# Patient Record
Sex: FEMALE | Race: WHITE | Employment: FULL TIME | URBAN - METROPOLITAN AREA
[De-identification: names, ages, dates, MRNs, and addresses within clinical notes are randomized per-mention and may not be internally consistent; named-entity substitution may affect disease eponyms.]

---

## 2022-12-08 RX ORDER — GLIPIZIDE 2.5 MG/1
TABLET, EXTENDED RELEASE ORAL 2 TIMES DAILY
COMMUNITY

## 2022-12-08 RX ORDER — METFORMIN HYDROCHLORIDE 500 MG/1
2 TABLET, EXTENDED RELEASE ORAL 2 TIMES DAILY
COMMUNITY

## 2022-12-08 RX ORDER — HYDROXYZINE 25 MG/1
1 TABLET, FILM COATED ORAL
COMMUNITY
End: 2022-12-12 | Stop reason: ALTCHOICE

## 2022-12-08 RX ORDER — GLIPIZIDE 5 MG/1
TABLET, FILM COATED, EXTENDED RELEASE ORAL 2 TIMES DAILY
COMMUNITY

## 2022-12-08 RX ORDER — TRIAMCINOLONE ACETONIDE 1 MG/G
CREAM TOPICAL 2 TIMES DAILY
COMMUNITY

## 2022-12-08 RX ORDER — METOPROLOL SUCCINATE 25 MG/1
1 TABLET, EXTENDED RELEASE ORAL DAILY
COMMUNITY

## 2022-12-08 RX ORDER — CLOBETASOL PROPIONATE 0.5 MG/G
CREAM TOPICAL 2 TIMES WEEKLY
COMMUNITY

## 2022-12-08 RX ORDER — OLMESARTAN MEDOXOMIL AND HYDROCHLOROTHIAZIDE 40/12.5 40; 12.5 MG/1; MG/1
1 TABLET ORAL DAILY
COMMUNITY
End: 2022-12-12 | Stop reason: DRUGHIGH

## 2022-12-12 ENCOUNTER — OFFICE VISIT (OUTPATIENT)
Dept: CARDIOLOGY CLINIC | Age: 72
End: 2022-12-12
Payer: MEDICARE

## 2022-12-12 VITALS
HEART RATE: 92 BPM | HEIGHT: 66 IN | DIASTOLIC BLOOD PRESSURE: 78 MMHG | SYSTOLIC BLOOD PRESSURE: 148 MMHG | WEIGHT: 195.6 LBS | OXYGEN SATURATION: 96 % | BODY MASS INDEX: 31.43 KG/M2

## 2022-12-12 DIAGNOSIS — E11.9 TYPE 2 DIABETES MELLITUS WITHOUT COMPLICATION, WITHOUT LONG-TERM CURRENT USE OF INSULIN (HCC): ICD-10-CM

## 2022-12-12 DIAGNOSIS — I10 HYPERTENSION, UNSPECIFIED TYPE: Primary | ICD-10-CM

## 2022-12-12 DIAGNOSIS — R07.9 CHEST PAIN, UNSPECIFIED TYPE: ICD-10-CM

## 2022-12-12 PROCEDURE — 99204 OFFICE O/P NEW MOD 45 MIN: CPT | Performed by: INTERNAL MEDICINE

## 2022-12-12 PROCEDURE — 93010 ELECTROCARDIOGRAM REPORT: CPT | Performed by: INTERNAL MEDICINE

## 2022-12-12 PROCEDURE — 3077F SYST BP >= 140 MM HG: CPT | Performed by: INTERNAL MEDICINE

## 2022-12-12 PROCEDURE — 93005 ELECTROCARDIOGRAM TRACING: CPT | Performed by: INTERNAL MEDICINE

## 2022-12-12 PROCEDURE — G0463 HOSPITAL OUTPT CLINIC VISIT: HCPCS | Performed by: INTERNAL MEDICINE

## 2022-12-12 PROCEDURE — 1123F ACP DISCUSS/DSCN MKR DOCD: CPT | Performed by: INTERNAL MEDICINE

## 2022-12-12 PROCEDURE — 3078F DIAST BP <80 MM HG: CPT | Performed by: INTERNAL MEDICINE

## 2022-12-12 RX ORDER — OLMESARTAN MEDOXOMIL 40 MG/1
TABLET ORAL DAILY
COMMUNITY

## 2022-12-12 RX ORDER — AMLODIPINE BESYLATE 10 MG/1
10 TABLET ORAL DAILY
COMMUNITY

## 2022-12-12 RX ORDER — BUSPIRONE HYDROCHLORIDE 5 MG/1
5 TABLET ORAL 2 TIMES DAILY
COMMUNITY
Start: 2022-11-28

## 2022-12-12 NOTE — PROGRESS NOTES
Jey Felder is a 67 y.o. female    Chief Complaint   Patient presents with    New Patient     Elevated BP       Vitals:    12/12/22 0851 12/12/22 0912   BP: (!) 150/84 (!) 148/78   BP 1 Location: Left upper arm Left upper arm   BP Patient Position: Sitting Sitting   Pulse: 92    Height: 5' 6\" (1.676 m)    Weight: 195 lb 9.6 oz (88.7 kg)    SpO2: 96%        Chest pain no    SOB patient states SOB     Dizziness no    Swelling no    Refills no      1. Have you been to the ER, urgent care clinic since your last visit? Hospitalized since your last visit? [facility]     2. Have you seen or consulted any other health care providers outside of the [address] [place] [address] since your last visit? Include any pap smears or colon screening.  No

## 2022-12-12 NOTE — PROGRESS NOTES
Catrachita Barahona MD, MS, [facility]            HISTORY OF PRESENT ILLNESS:    Niels Oshea is a 67 y.o. female referred for elevated BP, CP. PMH DM2, HTN. Was on Prozac, stopped, went back on - developed CP, SOB and panic sensation. Went to [facility] ED - BP elevated, gonzales negative, sent home with cardiology FU. Was admitted 4 days, echo with bubble, cardiology consult. Told her to breathe, count to 10. CTPA negative for PE. Was hyponatremic. HCTZ stopped - olmesartan alone. Prozac was stopped, week of Thanksgiving. Started buspirone, Monday after Thanksgiving - has felt better. Now looks forward to things. Still SOB at times, less. Saw Dr. Lisbeth Catalan in August - BP elevated. FU Dr. Kendra Najjar - given hydrozyzine. COVID booster 9/21/2022 - BP went up. Anxiety with taking her BP. Amlodipine 5, then 10 added. Anderson Levinthall syndrome. Stress in life - lost children, parents, brother. Discussed BP control = exercise nuclear stress test.      Total time spent >35 min, reviewing my prior notes, referring physician notes, other subspecialty and primary care notes, all available lab values, all available cardiac testing, all available radiology imaging, vital signs, weights, medications, potential medication interactions, family history, preparing my notes, updating diagnoses, correcting chart errors from other providers, documenting the above, discussing findings/results/testing methodologies/plan with patient, ordering tests/lab, sending prescriptions. SUMMARY:   Problem List  Date Reviewed: 12/12/2022            Codes Class Noted    Diabetes ([facility] ICD-10-CM: E11.9  ICD-9-CM: 250.00  Unknown        Hypertension ICD-10-CM: I10  ICD-9-CM: 401.9  12/12/2022        Chest pain ICD-10-CM: R07.9  ICD-9-CM: 786.50  12/12/2022           Current Outpatient Medications on File Prior to Visit   Medication Sig    olmesartan (BENICAR) 40 mg tablet Take  by mouth daily.     amLODIPine (NORVASC) 10 mg tablet Take 10 mg by mouth daily. busPIRone (BUSPAR) 5 mg tablet Take 5 mg by mouth two (2) times a day. clobetasoL (TEMOVATE) 0.05 % topical cream Apply  to affected area two (2) times a week. glipiZIDE SR (GLUCOTROL XL) 5 mg CR tablet Take  by mouth two (2) times a day. metFORMIN ER (GLUCOPHAGE XR) 500 mg tablet Take 2 Tablets by mouth two (2) times a day. glipiZIDE SR (GLUCOTROL XL) 2.5 mg CR tablet Take  by mouth two (2) times a day. Take 2.5 mg orally in the morning everday in addition to 5mg orally BID    metoprolol succinate (TOPROL-XL) 25 mg XL tablet Take 1 Tablet by mouth daily. triamcinolone acetonide (KENALOG) 0.1 % topical cream Apply  to affected area two (2) times a day. use thin layer     No current facility-administered medications on file prior to visit. CARDIOLOGY STUDIES TO DATE:  No results found for this visit on 12/12/22.                 CARDIAC ROS:   negative    Past Medical History:   Diagnosis Date    Anxiety     Cancer ([facility]     ovarian    Diabetes ([facility]     Essential hypertension     Anderson-Leventhal syndrome        Family History   Problem Relation Age of Onset    Cancer Father     Cancer Brother        Social History     Socioeconomic History    Marital status:      Spouse name: Not on file    Number of children: Not on file    Years of education: Not on file    Highest education level: Not on file   Occupational History    Not on file   Tobacco Use    Smoking status: Never     Passive exposure: Never    Smokeless tobacco: Never   Substance and Sexual Activity    Alcohol use: Not on file    Drug use: Never    Sexual activity: Not on file   Other Topics Concern    Not on file   Social History Narrative    Not on file     Social Determinants of Health     Financial Resource Strain: Not on file   Food Insecurity: Not on file   Transportation Needs: Not on file   Physical Activity: Not on file   Stress: Not on file   Social Connections: Not on file Intimate Partner Violence: Not on file   Housing Stability: Not on file        GENERAL ROS:  A comprehensive review of systems was negative except for that written in the HPI. Visit Vitals  BP (!) 148/78 (BP 1 Location: Left upper arm, BP Patient Position: Sitting)   Pulse 92   Ht 5' 6\" (1.676 m)   Wt 88.7 kg (195 lb 9.6 oz)   SpO2 96%   BMI 31.57 kg/m²     Vitals:    12/12/22 0851 12/12/22 0912   BP: (!) 150/84 (!) 148/78   Pulse: 92    SpO2: 96%    Weight: 88.7 kg (195 lb 9.6 oz)    Height: 5' 6\" (1.676 m)         Wt Readings from Last 3 Encounters:   12/12/22 88.7 kg (195 lb 9.6 oz)            BP Readings from Last 3 Encounters:   12/12/22 (!) 148/78       PHYSICAL EXAM  General appearance: alert, cooperative, no distress, appears stated age  Neck: supple, symmetrical, trachea midline, no adenopathy, thyroid: not enlarged, symmetric, no tenderness/mass/nodules, no carotid bruit, and no JVD  Lungs: clear to auscultation bilaterally  Heart: regular rate and rhythm, S1, S2 normal, no murmur, click, rub or gallop  Extremities: extremities normal, atraumatic, no cyanosis or edema    No results found for: CHOL, CHOLX, CHLST, CHOLV, 049831, HDL, HDLP, LDL, LDLC, DLDLP, TGLX, TRIGL, TRIGP, CHHD, CHHDX    No results found for: WBC, WBCLT, HGBPOC, HGB, HGBP, HCTPOC, HCT, PHCT, RBCH, PLT, MCV, HGBEXT, HCTEXT, PLTEXT, HGBEXT, HCTEXT, PLTEXT     No results found for: CHOL, CHOLPOCT, CHOLX, CHLST, CHOLV, HDL, HDLP, LDL, LDLC, DLDLP, TGLX, TRIGL, TRIGP, TGLPOCT, NTGLT, CHHD, CHHDX     ASSESSMENT  Diagnoses and all orders for this visit:    1. Hypertension, unspecified type  -     AMB POC EKG ROUTINE W/ 12 LEADS, INTER & REP    2. Type 2 diabetes mellitus without complication, without long-term current use of insulin (HCC)    3. Chest pain, unspecified type       Encounter Diagnoses   Name Primary?     Hypertension, unspecified type Yes    Type 2 diabetes mellitus without complication, without long-term current use of insulin ([address]     Chest pain, unspecified type      Orders Placed This Encounter    AMB POC EKG ROUTINE W/ 12 LEADS, INTER & REP    clobetasoL (TEMOVATE) 0.05 % topical cream    glipiZIDE SR (GLUCOTROL XL) 5 mg CR tablet    metFORMIN ER (GLUCOPHAGE XR) 500 mg tablet    DISCONTD: olmesartan-hydroCHLOROthiazide (BENICAR HCT) 40-12.5 mg per tablet    glipiZIDE SR (GLUCOTROL XL) 2.5 mg CR tablet    DISCONTD: hydrOXYzine HCL (ATARAX) 25 mg tablet    metoprolol succinate (TOPROL-XL) 25 mg XL tablet    triamcinolone acetonide (KENALOG) 0.1 % topical cream    olmesartan (BENICAR) 40 mg tablet    amLODIPine (NORVASC) 10 mg tablet    busPIRone (BUSPAR) 5 mg tablet       Plan          Estee Soliman MD  12/12/2022        [address] [place] [address] [address]  [address]  [address] [place] [address] (F)    [address] [place] [address] Nw  [phone] (P)  [phone] (F)    ATTENTION:   This medical record was transcribed using an electronic medical records/speech recognition system. Although proofread, it may and can contain electronic, spelling and other errors. Corrections may be executed at a later time. Please feel free to contact us for any clarifications as needed.

## 2023-01-03 ENCOUNTER — ANCILLARY PROCEDURE (OUTPATIENT)
Dept: CARDIOLOGY CLINIC | Age: 73
End: 2023-01-03
Payer: MEDICARE

## 2023-01-03 VITALS — WEIGHT: 195 LBS | HEIGHT: 66 IN | BODY MASS INDEX: 31.34 KG/M2

## 2023-01-03 DIAGNOSIS — R07.9 CHEST PAIN, UNSPECIFIED TYPE: ICD-10-CM

## 2023-01-03 DIAGNOSIS — E11.9 TYPE 2 DIABETES MELLITUS WITHOUT COMPLICATION, WITHOUT LONG-TERM CURRENT USE OF INSULIN (HCC): ICD-10-CM

## 2023-01-03 DIAGNOSIS — I10 HYPERTENSION, UNSPECIFIED TYPE: ICD-10-CM

## 2023-01-03 PROCEDURE — 93017 CV STRESS TEST TRACING ONLY: CPT | Performed by: INTERNAL MEDICINE

## 2023-01-03 PROCEDURE — A9500 TC99M SESTAMIBI: HCPCS | Performed by: INTERNAL MEDICINE

## 2023-01-03 RX ORDER — TETRAKIS(2-METHOXYISOBUTYLISOCYANIDE)COPPER(I) TETRAFLUOROBORATE 1 MG/ML
30 INJECTION, POWDER, LYOPHILIZED, FOR SOLUTION INTRAVENOUS ONCE
Status: COMPLETED | OUTPATIENT
Start: 2023-01-03 | End: 2023-01-03

## 2023-01-03 RX ORDER — TETRAKIS(2-METHOXYISOBUTYLISOCYANIDE)COPPER(I) TETRAFLUOROBORATE 1 MG/ML
10 INJECTION, POWDER, LYOPHILIZED, FOR SOLUTION INTRAVENOUS ONCE
Status: COMPLETED | OUTPATIENT
Start: 2023-01-03 | End: 2023-01-03

## 2023-01-03 RX ADMIN — TECHNETIUM TC 99M SESTAMIBI 24.6 MILLICURIE: 1 INJECTION, POWDER, FOR SOLUTION INTRAVENOUS at 09:42

## 2023-01-03 RX ADMIN — TECHNETIUM TC 99M SESTAMIBI 8.1 MILLICURIE: 1 INJECTION, POWDER, FOR SOLUTION INTRAVENOUS at 08:14

## 2023-01-06 LAB
NUC STRESS EJECTION FRACTION: 83 %
STRESS ANGINA INDEX: 0
STRESS BASELINE DIAS BP: 68 MMHG
STRESS BASELINE HR: 103 BPM
STRESS BASELINE ST DEPRESSION: 0 MM
STRESS BASELINE SYS BP: 140 MMHG
STRESS ESTIMATED WORKLOAD: 4.6 METS
STRESS EXERCISE DUR MIN: 3 MIN
STRESS EXERCISE DUR SEC: 31 SEC
STRESS O2 SAT PEAK: 98 %
STRESS O2 SAT REST: 98 %
STRESS PEAK DIAS BP: 74 MMHG
STRESS PEAK SYS BP: 164 MMHG
STRESS PERCENT HR ACHIEVED: 93 %
STRESS POST PEAK HR: 137 BPM
STRESS RATE PRESSURE PRODUCT: NORMAL BPM*MMHG
STRESS SR DUKE TREADMILL SCORE: 4
STRESS ST DEPRESSION: 0 MM
STRESS TARGET HR: 148 BPM

## 2023-01-06 NOTE — PROGRESS NOTES
Hi, good news-your stress test showed normal blood flow to the heart muscle.   Your heart function was normal.    Dr. Ivan Fry

## 2023-01-11 ENCOUNTER — PATIENT MESSAGE (OUTPATIENT)
Dept: CARDIOLOGY CLINIC | Age: 73
End: 2023-01-11

## 2023-01-12 NOTE — TELEPHONE ENCOUNTER
Eduardo Painting RN 1/12/2023 8:27 AM EST      ----- Message -----  From: Mallika Sharpe  Sent: 1/11/2023 10:21 PM EST  To: [facility] Nurse Pool  Subject: Question regarding NUCLEAR CARDIAC STRESS TE*    Now that stress test is done and normal, should I have a follow up appointment with you to discuss everything and medications? Thank you.

## 2023-02-07 ENCOUNTER — OFFICE VISIT (OUTPATIENT)
Dept: CARDIOLOGY CLINIC | Age: 73
End: 2023-02-07
Payer: MEDICARE

## 2023-02-07 VITALS
HEART RATE: 86 BPM | SYSTOLIC BLOOD PRESSURE: 146 MMHG | HEIGHT: 66 IN | WEIGHT: 205 LBS | OXYGEN SATURATION: 97 % | DIASTOLIC BLOOD PRESSURE: 64 MMHG | BODY MASS INDEX: 32.95 KG/M2

## 2023-02-07 DIAGNOSIS — R06.02 SOB (SHORTNESS OF BREATH): ICD-10-CM

## 2023-02-07 DIAGNOSIS — E11.9 TYPE 2 DIABETES MELLITUS WITHOUT COMPLICATION, WITHOUT LONG-TERM CURRENT USE OF INSULIN (HCC): ICD-10-CM

## 2023-02-07 DIAGNOSIS — I10 HYPERTENSION, UNSPECIFIED TYPE: Primary | ICD-10-CM

## 2023-02-07 PROCEDURE — G8417 CALC BMI ABV UP PARAM F/U: HCPCS | Performed by: INTERNAL MEDICINE

## 2023-02-07 PROCEDURE — 99214 OFFICE O/P EST MOD 30 MIN: CPT | Performed by: INTERNAL MEDICINE

## 2023-02-07 PROCEDURE — 1101F PT FALLS ASSESS-DOCD LE1/YR: CPT | Performed by: INTERNAL MEDICINE

## 2023-02-07 PROCEDURE — G8400 PT W/DXA NO RESULTS DOC: HCPCS | Performed by: INTERNAL MEDICINE

## 2023-02-07 PROCEDURE — 1123F ACP DISCUSS/DSCN MKR DOCD: CPT | Performed by: INTERNAL MEDICINE

## 2023-02-07 PROCEDURE — 1090F PRES/ABSN URINE INCON ASSESS: CPT | Performed by: INTERNAL MEDICINE

## 2023-02-07 PROCEDURE — 3078F DIAST BP <80 MM HG: CPT | Performed by: INTERNAL MEDICINE

## 2023-02-07 PROCEDURE — G8510 SCR DEP NEG, NO PLAN REQD: HCPCS | Performed by: INTERNAL MEDICINE

## 2023-02-07 PROCEDURE — G8536 NO DOC ELDER MAL SCRN: HCPCS | Performed by: INTERNAL MEDICINE

## 2023-02-07 PROCEDURE — 3077F SYST BP >= 140 MM HG: CPT | Performed by: INTERNAL MEDICINE

## 2023-02-07 PROCEDURE — 2022F DILAT RTA XM EVC RTNOPTHY: CPT | Performed by: INTERNAL MEDICINE

## 2023-02-07 PROCEDURE — 3046F HEMOGLOBIN A1C LEVEL >9.0%: CPT | Performed by: INTERNAL MEDICINE

## 2023-02-07 PROCEDURE — G8427 DOCREV CUR MEDS BY ELIG CLIN: HCPCS | Performed by: INTERNAL MEDICINE

## 2023-02-07 PROCEDURE — G0463 HOSPITAL OUTPT CLINIC VISIT: HCPCS | Performed by: INTERNAL MEDICINE

## 2023-02-07 PROCEDURE — 3017F COLORECTAL CA SCREEN DOC REV: CPT | Performed by: INTERNAL MEDICINE

## 2023-02-07 RX ORDER — DOXAZOSIN 4 MG/1
4 TABLET ORAL DAILY
Qty: 90 TABLET | Refills: 3 | Status: SHIPPED | OUTPATIENT
Start: 2023-02-07

## 2023-02-07 NOTE — PROGRESS NOTES
Chief Complaint   Patient presents with    Follow-up     Testing follow up     Vitals:    02/07/23 0853 02/07/23 0900   BP: (!) 150/68 (!) 146/64   BP 1 Location: Left upper arm Right upper arm   BP Patient Position: Sitting Sitting   Pulse: 86    Height: 5' 6\" (1.676 m)    Weight: 205 lb (93 kg)    SpO2: 97%      Chest pain denied   SOB random  Palpitations denied   Swelling in hands/feet denied   Dizziness denied   Recent hospital stays denied   Refills denied

## 2023-02-07 NOTE — PROGRESS NOTES
Per Dr. Joanne Munoz- follow up 6-8 weeks. Pulmonary referral-PFTs for SOB. Referred contact info provided to patient.

## 2023-02-07 NOTE — PROGRESS NOTES
Lexie Calvo MD, MS, [facility]            HISTORY OF PRESENT ILLNESS:    Jai Payan is a 67 y.o. female referred for elevated BP, CP. PMH DM2, HTN. Was on Prozac, stopped, went back on - developed CP, SOB and panic sensation. Went to [facility] ED - BP elevated, gonzales negative, sent home with cardiology FU. Was admitted 4 days, echo with bubble, cardiology consult. Told her to breathe, count to 10. CTPA negative for PE. Was hyponatremic. HCTZ stopped - olmesartan alone. Prozac was stopped, week of Thanksgiving. Started buspirone, Monday after Thanksgiving - has felt better. Now looks forward to things. Still SOB at times, less. Saw Dr. Gasper Tony in August - BP elevated. FU Dr. Namrata Justice - given hydrozyzine. COVID booster 9/21/2022 - BP went up. Anxiety with taking her BP. Amlodipine 5, then 10 added. Anderson Levinthall syndrome. Stress in life - lost children, parents, brother. Discussed BP control = exercise nuclear stress test - performed 1/3/2023 - normal exercise nuclear stress test @ 93% MPHR, normal perfusion, post stress EF 83%. SUMMARY:   Problem List  Date Reviewed: 2/7/2023            Codes Class Noted    Diabetes ([facility] ICD-10-CM: E11.9  ICD-9-CM: 250.00  Unknown        Hypertension ICD-10-CM: I10  ICD-9-CM: 401.9  12/12/2022        Chest pain ICD-10-CM: R07.9  ICD-9-CM: 786.50  12/12/2022           Current Outpatient Medications on File Prior to Visit   Medication Sig    olmesartan (BENICAR) 40 mg tablet Take  by mouth daily. amLODIPine (NORVASC) 10 mg tablet Take 10 mg by mouth daily. busPIRone (BUSPAR) 5 mg tablet Take 5 mg by mouth two (2) times a day. clobetasoL (TEMOVATE) 0.05 % topical cream Apply  to affected area two (2) times a week. glipiZIDE SR (GLUCOTROL XL) 5 mg CR tablet Take  by mouth two (2) times a day. metFORMIN ER (GLUCOPHAGE XR) 500 mg tablet Take 2 Tablets by mouth two (2) times a day.     glipiZIDE SR (GLUCOTROL XL) 2.5 mg CR tablet Take 2.5 mg by mouth two (2) times a day. Along with 5mg tab bid to equal 7.5mg bid    metoprolol succinate (TOPROL-XL) 25 mg XL tablet Take 1 Tablet by mouth daily. triamcinolone acetonide (KENALOG) 0.1 % topical cream Apply  to affected area two (2) times a day. use thin layer     No current facility-administered medications on file prior to visit. CARDIOLOGY STUDIES TO DATE:  No results found for this visit on 02/07/23. CARDIAC ROS:   negative    Past Medical History:   Diagnosis Date    Anxiety     Cancer ([facility]     ovarian    Diabetes ([facility]     Essential hypertension     Anderson-Leventhal syndrome        Family History   Problem Relation Age of Onset    Cancer Father     Cancer Brother        Social History     Socioeconomic History    Marital status:      Spouse name: Not on file    Number of children: Not on file    Years of education: Not on file    Highest education level: Not on file   Occupational History    Not on file   Tobacco Use    Smoking status: Never     Passive exposure: Never    Smokeless tobacco: Never   Substance and Sexual Activity    Alcohol use: Not on file    Drug use: Never    Sexual activity: Not on file   Other Topics Concern    Not on file   Social History Narrative    Not on file     Social Determinants of Health     Financial Resource Strain: Not on file   Food Insecurity: Not on file   Transportation Needs: Not on file   Physical Activity: Not on file   Stress: Not on file   Social Connections: Not on file   Intimate Partner Violence: Not on file   Housing Stability: Not on file        GENERAL ROS:  A comprehensive review of systems was negative except for that written in the HPI.     Visit Vitals  BP (!) 146/64 (BP 1 Location: Right upper arm, BP Patient Position: Sitting)   Pulse 86   Ht 5' 6\" (1.676 m)   Wt 93 kg (205 lb)   SpO2 97%   BMI 33.09 kg/m²     Vitals:    02/07/23 0853 02/07/23 0900   BP: (!) 150/68 (!) 146/64   Pulse: 86    SpO2: 97%    Weight: 93 kg (205 lb)    Height: 5' 6\" (1.676 m)         Wt Readings from Last 3 Encounters:   02/07/23 93 kg (205 lb)   01/03/23 88.5 kg (195 lb)   12/12/22 88.7 kg (195 lb 9.6 oz)            BP Readings from Last 3 Encounters:   02/07/23 (!) 146/64   12/12/22 (!) 148/78       PHYSICAL EXAM  General appearance: alert, cooperative, no distress, appears stated age  Neck: supple, symmetrical, trachea midline, no adenopathy, thyroid: not enlarged, symmetric, no tenderness/mass/nodules, no carotid bruit, and no JVD  Lungs: clear to auscultation bilaterally  Heart: regular rate and rhythm, S1, S2 normal, no murmur, click, rub or gallop  Extremities: extremities normal, atraumatic, no cyanosis or edema    No results found for: CHOL, CHOLX, CHLST, CHOLV, 606861, HDL, HDLP, LDL, LDLC, DLDLP, TGLX, TRIGL, TRIGP, CHHD, CHHDX    No results found for: WBC, WBCLT, HGBPOC, HGB, HGBP, HCTPOC, HCT, PHCT, RBCH, PLT, MCV, HGBEXT, HCTEXT, PLTEXT, HGBEXT, HCTEXT, PLTEXT     No results found for: CHOL, CHOLPOCT, CHOLX, CHLST, CHOLV, HDL, HDLP, LDL, LDLC, DLDLP, TGLX, TRIGL, TRIGP, TGLPOCT, NTGLT, CHHD, CHHDX     ASSESSMENT  Diagnoses and all orders for this visit:    1. Hypertension, unspecified type  -     doxazosin (CARDURA) 4 mg tablet; Take 1 Tablet by mouth daily. 2. Type 2 diabetes mellitus without complication, without long-term current use of insulin ([facility]         Encounter Diagnoses   Name Primary? Hypertension, unspecified type Yes    Type 2 diabetes mellitus without complication, without long-term current use of insulin ([facility]        Orders Placed This Encounter    doxazosin (CARDURA) 4 mg tablet         Plan      Follow-up and Dispositions    Return in about 8 weeks (around 4/4/2023).          Delmer Montana MD  2/7/2023        [address]  [address]  [place], [address] [place] [address]  [phone] (U)  [phone] (F)    [address] [facility]  [address]  [phone]  [phone] (F)    ATTENTION:   This medical record was transcribed using an electronic medical records/speech recognition system. Although proofread, it may and can contain electronic, spelling and other errors. Corrections may be executed at a later time. Please feel free to contact us for any clarifications as needed.

## 2023-02-09 ENCOUNTER — TELEPHONE (OUTPATIENT)
Dept: CARDIOLOGY CLINIC | Age: 73
End: 2023-02-09

## 2023-02-09 NOTE — TELEPHONE ENCOUNTER
[facility]     Per Dr. Mounika Shannon stop doxazosin. See if patient has taken a BB. Per Dr. Mounika Shannon  \"Stop medication. BB would be next choice - can she tolerate metoprolol, has she tried it?   If not send toprol xl 25\"

## 2023-02-09 NOTE — TELEPHONE ENCOUNTER
Patient started a new medication Doxazosin and she is having an allergic reaction to the medicine within an hour of taking it. Patient said she felt weak,dry heaves, light headed. /64 104/52.    [phone]

## 2023-02-10 ENCOUNTER — TELEPHONE (OUTPATIENT)
Dept: CARDIOLOGY CLINIC | Age: 73
End: 2023-02-10

## 2023-02-10 NOTE — TELEPHONE ENCOUNTER
Patient calling to follow up on call from yesterday about having an allergic reaction to a medication she is taking.  Please advise    pt # [phone] will be there until 2pm

## 2023-02-13 NOTE — TELEPHONE ENCOUNTER
Voicemail was left on Thursday for patient and see previous documentation:  You 4 days ago     [address]      Per Dr. Shari Miles stop doxazosin. See if patient has taken a BB. Per Dr. Shari Miles  \"Stop medication. BB would be next choice - can she tolerate metoprolol, has she tried it? If not send toprol xl 25\"         Note       Patient denied voicemail. Verified both numbers on file. Discussed with Dr. Shari Miles that patient is currently on toprol xl 25mg daily and history of low HR- Per Dr. Shari Miles- trial Toprol XL 50mg daily and monitor Bp/HR at home and if tolerating will send new script.

## 2023-02-20 ENCOUNTER — PATIENT MESSAGE (OUTPATIENT)
Dept: CARDIOLOGY CLINIC | Age: 73
End: 2023-02-20

## 2023-02-20 NOTE — TELEPHONE ENCOUNTER
Spoke with patient after ID x2 and conveyed no changes at this time. Patient expressed understanding and will return call as needed. Per Dr. Perez ChaudhryBilly Devoid her and continue current regimen. \"                               ----- Message -----  From: Jailyn Bradley  Sent: 2/20/2023   8:16 AM EST  To: [facility] Nurse Pool  Subject: BP results                                       Hi Dr. Ellyn Clayton. I have been taking 40 mg. Olmesartan and 10 mg Amlodipine Besylate with breakfast and I doubled my Metoprolol Succ. from25 mg to 50 mg. per your instructions. I take the Metoprolol with dinner. My BP has stayed the same or gone up and I am getting very nervous. I started with the doubled Metoprolol (50 mg) on Feb. 14, 2023. Here are my BPs each day since then :  146/66, 149/65, 158/72, 153/68, 158/70 and this morning about an hour after meds it is 149/68. Please advise me going forward.   Thanks so much, Jailyn Bradley

## 2023-03-28 ENCOUNTER — OFFICE VISIT (OUTPATIENT)
Dept: CARDIOLOGY CLINIC | Age: 73
End: 2023-03-28
Payer: MEDICARE

## 2023-03-28 VITALS
HEIGHT: 66 IN | SYSTOLIC BLOOD PRESSURE: 138 MMHG | BODY MASS INDEX: 32.78 KG/M2 | DIASTOLIC BLOOD PRESSURE: 70 MMHG | OXYGEN SATURATION: 97 % | WEIGHT: 204 LBS | HEART RATE: 67 BPM

## 2023-03-28 DIAGNOSIS — R07.9 CHEST PAIN, UNSPECIFIED TYPE: ICD-10-CM

## 2023-03-28 DIAGNOSIS — I10 HYPERTENSION, UNSPECIFIED TYPE: Primary | ICD-10-CM

## 2023-03-28 DIAGNOSIS — R06.02 SOB (SHORTNESS OF BREATH): ICD-10-CM

## 2023-03-28 DIAGNOSIS — E11.9 TYPE 2 DIABETES MELLITUS WITHOUT COMPLICATION, WITHOUT LONG-TERM CURRENT USE OF INSULIN (HCC): ICD-10-CM

## 2023-03-28 PROCEDURE — 99214 OFFICE O/P EST MOD 30 MIN: CPT | Performed by: INTERNAL MEDICINE

## 2023-03-28 PROCEDURE — 1090F PRES/ABSN URINE INCON ASSESS: CPT | Performed by: INTERNAL MEDICINE

## 2023-03-28 PROCEDURE — G8536 NO DOC ELDER MAL SCRN: HCPCS | Performed by: INTERNAL MEDICINE

## 2023-03-28 PROCEDURE — 1123F ACP DISCUSS/DSCN MKR DOCD: CPT | Performed by: INTERNAL MEDICINE

## 2023-03-28 PROCEDURE — 3046F HEMOGLOBIN A1C LEVEL >9.0%: CPT | Performed by: INTERNAL MEDICINE

## 2023-03-28 PROCEDURE — G8432 DEP SCR NOT DOC, RNG: HCPCS | Performed by: INTERNAL MEDICINE

## 2023-03-28 PROCEDURE — G8417 CALC BMI ABV UP PARAM F/U: HCPCS | Performed by: INTERNAL MEDICINE

## 2023-03-28 PROCEDURE — 3075F SYST BP GE 130 - 139MM HG: CPT | Performed by: INTERNAL MEDICINE

## 2023-03-28 PROCEDURE — 1101F PT FALLS ASSESS-DOCD LE1/YR: CPT | Performed by: INTERNAL MEDICINE

## 2023-03-28 PROCEDURE — G0463 HOSPITAL OUTPT CLINIC VISIT: HCPCS | Performed by: INTERNAL MEDICINE

## 2023-03-28 PROCEDURE — G8400 PT W/DXA NO RESULTS DOC: HCPCS | Performed by: INTERNAL MEDICINE

## 2023-03-28 PROCEDURE — 3017F COLORECTAL CA SCREEN DOC REV: CPT | Performed by: INTERNAL MEDICINE

## 2023-03-28 PROCEDURE — 2022F DILAT RTA XM EVC RTNOPTHY: CPT | Performed by: INTERNAL MEDICINE

## 2023-03-28 PROCEDURE — 3078F DIAST BP <80 MM HG: CPT | Performed by: INTERNAL MEDICINE

## 2023-03-28 PROCEDURE — G8427 DOCREV CUR MEDS BY ELIG CLIN: HCPCS | Performed by: INTERNAL MEDICINE

## 2023-03-28 RX ORDER — METOPROLOL SUCCINATE 50 MG/1
50 TABLET, EXTENDED RELEASE ORAL DAILY
COMMUNITY

## 2023-03-28 NOTE — PROGRESS NOTES
Lettie Bellis Penne Goodpasture MD, [place], [facility]            HISTORY OF PRESENT ILLNESS:    Marv Duncan is a 67 y.o. female referred for elevated BP, CP here for FU. PMH DM2, HTN. Was on Prozac, stopped, went back on - developed CP, SOB and panic sensation. Went to [facility] ED - BP elevated, gonzales negative, sent home with cardiology FU. Was admitted 4 days, echo with bubble, cardiology consult. Told her to breathe, count to 10. CTPA negative for PE. Was hyponatremic. HCTZ stopped - olmesartan alone. Prozac was stopped, week of Thanksgiving. Started buspirone, Monday after Thanksgiving - has felt better. Now looks forward to things. Still SOB at times, less. Saw Dr. Yuan Elmore in August - BP elevated. FU Dr. Kaity Ac - given hydrozyzine. COVID booster 9/21/2022 - BP went up. Anxiety with taking her BP. Amlodipine 5, then 10 added. Anderson Levinthall syndrome. Stress in life - lost children, parents, brother. Discussed BP control = exercise nuclear stress test - performed 1/3/2023 - normal exercise nuclear stress test @ 93% MPHR, normal perfusion, post stress EF 83%. Rx'd doxasozin  - nausea, dry heave, LH, fatigued. Stopped. Added toprol increased to 50. BP fair, better. Plan to FU in 3 months- if Bps consistent lower may try and decreased amlodipine to 5 given mild LE edema. SUMMARY:   Problem List  Date Reviewed: 3/28/2023            Codes Class Noted    SOB (shortness of breath) ICD-10-CM: R06.02  ICD-9-CM: 786.05  2/7/2023        Diabetes ([facility] [address] ICD-10-CM: E11.9  ICD-9-CM: 250.00  Unknown        Hypertension ICD-10-CM: I10  ICD-9-CM: 401.9  12/12/2022        Chest pain ICD-10-CM: R07.9  ICD-9-CM: 786.50  12/12/2022           Current Outpatient Medications on File Prior to Visit   Medication Sig    metoprolol succinate (TOPROL-XL) 50 mg XL tablet Take 50 mg by mouth daily. olmesartan (BENICAR) 40 mg tablet Take  by mouth daily.     amLODIPine (NORVASC) 10 mg tablet Take 10 mg by mouth daily. busPIRone (BUSPAR) 5 mg tablet Take 5 mg by mouth two (2) times a day. clobetasoL (TEMOVATE) 0.05 % topical cream Apply  to affected area two (2) times a week. metFORMIN ER (GLUCOPHAGE XR) 500 mg tablet Take 2 Tablets by mouth two (2) times a day. glipiZIDE SR (GLUCOTROL XL) 2.5 mg CR tablet Take 5 mg by mouth two (2) times a day. Along with 5mg tab bid to equal 7.5mg bid    triamcinolone acetonide (KENALOG) 0.1 % topical cream Apply  to affected area two (2) times a day. use thin layer     No current facility-administered medications on file prior to visit. CARDIOLOGY STUDIES TO DATE:  No results found for this visit on 03/28/23. CARDIAC ROS:   negative    Past Medical History:   Diagnosis Date    Anxiety     Cancer ([facility] [address]     ovarian    Diabetes ([facility] [address]     Essential hypertension     Anderson-Leventhal syndrome        Family History   Problem Relation Age of Onset    Cancer Father     Cancer Brother        Social History     Socioeconomic History    Marital status:      Spouse name: Not on file    Number of children: Not on file    Years of education: Not on file    Highest education level: Not on file   Occupational History    Not on file   Tobacco Use    Smoking status: Never     Passive exposure: Never    Smokeless tobacco: Never   Substance and Sexual Activity    Alcohol use: Not on file    Drug use: Never    Sexual activity: Not on file   Other Topics Concern    Not on file   Social History Narrative    Not on file     Social Determinants of Health     Financial Resource Strain: Not on file   Food Insecurity: Not on file   Transportation Needs: Not on file   Physical Activity: Not on file   Stress: Not on file   Social Connections: Not on file   Intimate Partner Violence: Not on file   Housing Stability: Not on file        GENERAL ROS:  A comprehensive review of systems was negative except for that written in the HPI.     Visit Vitals  /70 (BP 1 Location: Left upper arm, BP Patient Position: Sitting, BP Cuff Size: Adult)   Pulse 67   Ht 5' 6\" (1.676 m)   Wt 92.5 kg (204 lb)   SpO2 97%   BMI 32.93 kg/m²     Vitals:    03/28/23 0858   BP: 138/70   Pulse: 67   SpO2: 97%   Weight: 92.5 kg (204 lb)   Height: 5' 6\" (1.676 m)        Wt Readings from Last 3 Encounters:   03/28/23 92.5 kg (204 lb)   02/07/23 93 kg (205 lb)   01/03/23 88.5 kg (195 lb)            BP Readings from Last 3 Encounters:   03/28/23 138/70   02/07/23 (!) 146/64   12/12/22 (!) 148/78       PHYSICAL EXAM  General appearance: alert, cooperative, no distress, appears stated age  Neck: supple, symmetrical, trachea midline, no adenopathy, thyroid: not enlarged, symmetric, no tenderness/mass/nodules, no carotid bruit, and no JVD  Lungs: clear to auscultation bilaterally  Heart: regular rate and rhythm, S1, S2 normal, no murmur, click, rub or gallop  Extremities: extremities normal, atraumatic, no cyanosis or edema    No results found for: CHOL, CHOLX, CHLST, CHOLV, [id number], HDL, HDLP, LDL, LDLC, DLDLP, TGLX, TRIGL, TRIGP, CHHD, CHHDX    No results found for: WBC, WBCLT, HGBPOC, HGB, HGBP, HCTPOC, HCT, PHCT, RBCH, PLT, MCV, HGBEXT, HCTEXT, PLTEXT, HGBEXT, HCTEXT, PLTEXT     No results found for: CHOL, CHOLPOCT, CHOLX, CHLST, CHOLV, HDL, HDLP, LDL, LDLC, DLDLP, TGLX, TRIGL, TRIGP, TGLPOCT, NTGLT, CHHD, CHHDX     ASSESSMENT  Diagnoses and all orders for this visit:    1. Hypertension, unspecified type    2. Chest pain, unspecified type    3. SOB (shortness of breath)    4. Type 2 diabetes mellitus without complication, without long-term current use of insulin ([facility]           Encounter Diagnoses   Name Primary?     Hypertension, unspecified type Yes    Chest pain, unspecified type     SOB (shortness of breath)     Type 2 diabetes mellitus without complication, without long-term current use of insulin ([facility])          Orders Placed This Encounter    metoprolol succinate (TOPROL-XL) 50 mg XL tablet Otilia Jurado MD  3/28/2023        [address]   [address]  [address]  [phone] (F)    [address]  [address]  [phone] (P)  [phone] (F)    ATTENTION:   This medical record was transcribed using an electronic medical records/speech recognition system. Although proofread, it may and can contain electronic, spelling and other errors. Corrections may be executed at a later time. Please feel free to contact us for any clarifications as needed.

## 2023-03-28 NOTE — LETTER
3/28/2023    Patient: Wolfgang Owen   YOB: 1950   Date of Visit: 3/28/2023     Adam Weeks MD  [facility] [phone]  Via Fax: [phone]    Dear Adam Weeks MD,      Thank you for referring Ms. Wolfgang Owen to [address] [place] [address] for evaluation. My notes for this consultation are attached. If you have questions, please do not hesitate to call me. I look forward to following your patient along with you.       Sincerely,    Maya Torres MD

## 2023-03-28 NOTE — PROGRESS NOTES
Aneesh Brewer is a 67 y.o. female    Vitals:    03/28/23 0858   BP: 138/70   BP 1 Location: Left upper arm   BP Patient Position: Sitting   BP Cuff Size: Adult   Pulse: 67   Height: 5' 6\" (1.676 m)   Weight: 204 lb (92.5 kg)   SpO2: 97%       Chief Complaint   Patient presents with    Hypertension    Shortness of Breath    Other     DM2       Chest pain NO  SOB NO  Dizziness NO  Swelling YES  Recent hospital visit NO  Refills NO  COVID VACCINE YES  HAD COVID?  NO

## 2023-06-27 ENCOUNTER — OFFICE VISIT (OUTPATIENT)
Age: 73
End: 2023-06-27
Payer: MEDICARE

## 2023-06-27 VITALS
OXYGEN SATURATION: 94 % | SYSTOLIC BLOOD PRESSURE: 120 MMHG | DIASTOLIC BLOOD PRESSURE: 72 MMHG | HEIGHT: 66 IN | BODY MASS INDEX: 32.62 KG/M2 | HEART RATE: 63 BPM | RESPIRATION RATE: 16 BRPM | WEIGHT: 203 LBS

## 2023-06-27 DIAGNOSIS — E11.9 TYPE 2 DIABETES MELLITUS WITHOUT COMPLICATION, WITHOUT LONG-TERM CURRENT USE OF INSULIN (HCC): ICD-10-CM

## 2023-06-27 DIAGNOSIS — R07.9 CHEST PAIN, UNSPECIFIED TYPE: Primary | ICD-10-CM

## 2023-06-27 DIAGNOSIS — I10 HYPERTENSION, UNSPECIFIED TYPE: ICD-10-CM

## 2023-06-27 DIAGNOSIS — R06.02 SOB (SHORTNESS OF BREATH): ICD-10-CM

## 2023-06-27 PROCEDURE — 3017F COLORECTAL CA SCREEN DOC REV: CPT | Performed by: INTERNAL MEDICINE

## 2023-06-27 PROCEDURE — 99214 OFFICE O/P EST MOD 30 MIN: CPT | Performed by: INTERNAL MEDICINE

## 2023-06-27 PROCEDURE — 3078F DIAST BP <80 MM HG: CPT | Performed by: INTERNAL MEDICINE

## 2023-06-27 PROCEDURE — G8417 CALC BMI ABV UP PARAM F/U: HCPCS | Performed by: INTERNAL MEDICINE

## 2023-06-27 PROCEDURE — 3046F HEMOGLOBIN A1C LEVEL >9.0%: CPT | Performed by: INTERNAL MEDICINE

## 2023-06-27 PROCEDURE — 1123F ACP DISCUSS/DSCN MKR DOCD: CPT | Performed by: INTERNAL MEDICINE

## 2023-06-27 PROCEDURE — 1036F TOBACCO NON-USER: CPT | Performed by: INTERNAL MEDICINE

## 2023-06-27 PROCEDURE — G8427 DOCREV CUR MEDS BY ELIG CLIN: HCPCS | Performed by: INTERNAL MEDICINE

## 2023-06-27 PROCEDURE — 1090F PRES/ABSN URINE INCON ASSESS: CPT | Performed by: INTERNAL MEDICINE

## 2023-06-27 PROCEDURE — 3074F SYST BP LT 130 MM HG: CPT | Performed by: INTERNAL MEDICINE

## 2023-06-27 PROCEDURE — 2022F DILAT RTA XM EVC RTNOPTHY: CPT | Performed by: INTERNAL MEDICINE

## 2023-06-27 PROCEDURE — G8400 PT W/DXA NO RESULTS DOC: HCPCS | Performed by: INTERNAL MEDICINE

## 2023-06-27 RX ORDER — BLOOD SUGAR DIAGNOSTIC
STRIP MISCELLANEOUS
COMMUNITY
Start: 2023-04-12

## 2023-06-27 RX ORDER — GLIPIZIDE 10 MG/1
10 TABLET, FILM COATED, EXTENDED RELEASE ORAL 2 TIMES DAILY
COMMUNITY
Start: 2023-04-03

## 2023-06-27 RX ORDER — METOPROLOL SUCCINATE 50 MG/1
50 TABLET, EXTENDED RELEASE ORAL DAILY
COMMUNITY
Start: 2023-06-05

## 2023-06-27 ASSESSMENT — PATIENT HEALTH QUESTIONNAIRE - PHQ9
1. LITTLE INTEREST OR PLEASURE IN DOING THINGS: 0
SUM OF ALL RESPONSES TO PHQ9 QUESTIONS 1 & 2: 0
SUM OF ALL RESPONSES TO PHQ QUESTIONS 1-9: 0
2. FEELING DOWN, DEPRESSED OR HOPELESS: 0
SUM OF ALL RESPONSES TO PHQ QUESTIONS 1-9: 0

## 2023-06-27 ASSESSMENT — ENCOUNTER SYMPTOMS
CHEST TIGHTNESS: 0
WHEEZING: 0
SHORTNESS OF BREATH: 0

## 2024-09-25 ENCOUNTER — OFFICE VISIT (OUTPATIENT)
Age: 74
End: 2024-09-25
Payer: MEDICARE

## 2024-09-25 VITALS
WEIGHT: 192 LBS | BODY MASS INDEX: 30.86 KG/M2 | HEIGHT: 66 IN | DIASTOLIC BLOOD PRESSURE: 60 MMHG | HEART RATE: 73 BPM | OXYGEN SATURATION: 96 % | SYSTOLIC BLOOD PRESSURE: 130 MMHG

## 2024-09-25 DIAGNOSIS — R07.1 CHEST PAIN ON BREATHING: Primary | ICD-10-CM

## 2024-09-25 PROCEDURE — G8417 CALC BMI ABV UP PARAM F/U: HCPCS | Performed by: INTERNAL MEDICINE

## 2024-09-25 PROCEDURE — 3017F COLORECTAL CA SCREEN DOC REV: CPT | Performed by: INTERNAL MEDICINE

## 2024-09-25 PROCEDURE — 3078F DIAST BP <80 MM HG: CPT | Performed by: INTERNAL MEDICINE

## 2024-09-25 PROCEDURE — G8427 DOCREV CUR MEDS BY ELIG CLIN: HCPCS | Performed by: INTERNAL MEDICINE

## 2024-09-25 PROCEDURE — 93005 ELECTROCARDIOGRAM TRACING: CPT | Performed by: INTERNAL MEDICINE

## 2024-09-25 PROCEDURE — 99214 OFFICE O/P EST MOD 30 MIN: CPT | Performed by: INTERNAL MEDICINE

## 2024-09-25 PROCEDURE — G8400 PT W/DXA NO RESULTS DOC: HCPCS | Performed by: INTERNAL MEDICINE

## 2024-09-25 PROCEDURE — 1090F PRES/ABSN URINE INCON ASSESS: CPT | Performed by: INTERNAL MEDICINE

## 2024-09-25 PROCEDURE — 1036F TOBACCO NON-USER: CPT | Performed by: INTERNAL MEDICINE

## 2024-09-25 PROCEDURE — 93010 ELECTROCARDIOGRAM REPORT: CPT | Performed by: INTERNAL MEDICINE

## 2024-09-25 PROCEDURE — 3075F SYST BP GE 130 - 139MM HG: CPT | Performed by: INTERNAL MEDICINE

## 2024-09-25 PROCEDURE — 1123F ACP DISCUSS/DSCN MKR DOCD: CPT | Performed by: INTERNAL MEDICINE

## 2024-09-25 RX ORDER — DAPAGLIFLOZIN 5 MG/1
5 TABLET, FILM COATED ORAL EVERY MORNING
COMMUNITY

## 2024-09-25 RX ORDER — NEBIVOLOL 10 MG/1
10 TABLET ORAL DAILY
COMMUNITY